# Patient Record
Sex: FEMALE | Race: BLACK OR AFRICAN AMERICAN | NOT HISPANIC OR LATINO | URBAN - METROPOLITAN AREA
[De-identification: names, ages, dates, MRNs, and addresses within clinical notes are randomized per-mention and may not be internally consistent; named-entity substitution may affect disease eponyms.]

---

## 2024-03-18 ENCOUNTER — OV NP (OUTPATIENT)
Dept: URBAN - METROPOLITAN AREA CLINIC 40 | Facility: CLINIC | Age: 24
End: 2024-03-18
Payer: COMMERCIAL

## 2024-03-18 VITALS
WEIGHT: 184 LBS | SYSTOLIC BLOOD PRESSURE: 120 MMHG | HEIGHT: 60 IN | BODY MASS INDEX: 36.12 KG/M2 | DIASTOLIC BLOOD PRESSURE: 80 MMHG | HEART RATE: 76 BPM

## 2024-03-18 DIAGNOSIS — R19.4 CHANGE IN BOWEL HABITS: ICD-10-CM

## 2024-03-18 PROCEDURE — 99203 OFFICE O/P NEW LOW 30 MIN: CPT | Performed by: INTERNAL MEDICINE

## 2024-03-18 RX ORDER — LACTOBACILLUS RHAMNOSUS GG 10B CELL
AS DIRECTED CAPSULE ORAL
Status: ACTIVE | COMMUNITY

## 2024-03-18 NOTE — HPI-TODAY'S VISIT:
Ms. Ferris is a 23-year-old black female seen for new patient evaluation of change in bowel habits.  Patient states on and off for the last 4 years she has noted issues.  Initially she called a fecal incontinence but on further questioning she is not having leakage of stool but rather mucus.  She states sometimes the mucus can pass when she is having flatulence or before a bowel movement.  Few weeks ago she noted some blood with the mucus although she thinks this is from her hidradenitis that is in that area.  Her stools can be loose and she is attributing this to some lactose issues.  She currently is taking Lactaid.  She has 2 bowel movements a day.  She denies any abdominal pain, nausea or reflux.  She has been on antibiotics on and off with his taking probiotics.  Her hidradenitis has been treated with Accutane as well as antibiotics.  She is never been on Humira.  There is no family history of inflammatory bowel disease.

## 2024-04-29 ENCOUNTER — OV EP (OUTPATIENT)
Dept: URBAN - METROPOLITAN AREA CLINIC 40 | Facility: CLINIC | Age: 24
End: 2024-04-29

## 2024-04-29 RX ORDER — LACTOBACILLUS RHAMNOSUS GG 10B CELL
AS DIRECTED CAPSULE ORAL
Status: ACTIVE | COMMUNITY

## 2024-05-24 ENCOUNTER — DASHBOARD ENCOUNTERS (OUTPATIENT)
Age: 24
End: 2024-05-24

## 2024-05-28 ENCOUNTER — OFFICE VISIT (OUTPATIENT)
Dept: URBAN - METROPOLITAN AREA CLINIC 40 | Facility: CLINIC | Age: 24
End: 2024-05-28

## 2024-05-28 RX ORDER — LACTOBACILLUS RHAMNOSUS GG 10B CELL
AS DIRECTED CAPSULE ORAL
Status: ACTIVE | COMMUNITY

## 2024-07-09 ENCOUNTER — OFFICE VISIT (OUTPATIENT)
Dept: URBAN - METROPOLITAN AREA CLINIC 40 | Facility: CLINIC | Age: 24
End: 2024-07-09

## 2024-08-06 ENCOUNTER — OFFICE VISIT (OUTPATIENT)
Dept: URBAN - METROPOLITAN AREA CLINIC 40 | Facility: CLINIC | Age: 24
End: 2024-08-06
Payer: COMMERCIAL

## 2024-08-06 ENCOUNTER — LAB OUTSIDE AN ENCOUNTER (OUTPATIENT)
Dept: URBAN - METROPOLITAN AREA CLINIC 40 | Facility: CLINIC | Age: 24
End: 2024-08-06

## 2024-08-06 VITALS
TEMPERATURE: 97.7 F | BODY MASS INDEX: 34.4 KG/M2 | HEIGHT: 60 IN | WEIGHT: 175.2 LBS | SYSTOLIC BLOOD PRESSURE: 128 MMHG | DIASTOLIC BLOOD PRESSURE: 82 MMHG

## 2024-08-06 DIAGNOSIS — R19.4 CHANGE IN BOWEL HABITS: ICD-10-CM

## 2024-08-06 PROCEDURE — 99214 OFFICE O/P EST MOD 30 MIN: CPT | Performed by: INTERNAL MEDICINE

## 2024-08-06 RX ORDER — LACTOBACILLUS RHAMNOSUS GG 10B CELL
AS DIRECTED CAPSULE ORAL
Status: ACTIVE | COMMUNITY

## 2024-08-06 NOTE — HPI-TODAY'S VISIT:
Ms. Ferris presents to clinic for follow-up.  She was initially seen in March.  She was complaining of a change in her bowel habits.  This was in the setting of her having a history of hidradenitis suppurativa.  We went ahead and got stool studies and advised her to follow FODMAP diet.  Stool studies were significant for borderline calprotectin at 115.  The rest of the stools were negative.  Patient did notice an improvement when cutting out lactose out of her diet with her stools.  She started doxycycline through her dermatologist as well as had wisdom teeth extracted and was on clindamycin.  She has some loose stools initially.  She had been on probiotic.  She is back to having 1-2 stools a day and then are more formed.  No rectal bleeding but she has noted intermittent mucus in the stool.

## 2024-10-25 ENCOUNTER — OFFICE VISIT (OUTPATIENT)
Dept: URBAN - METROPOLITAN AREA SURGERY CENTER 30 | Facility: SURGERY CENTER | Age: 24
End: 2024-10-25

## 2024-10-25 ENCOUNTER — CLAIMS CREATED FROM THE CLAIM WINDOW (OUTPATIENT)
Dept: URBAN - METROPOLITAN AREA SURGERY CENTER 30 | Facility: SURGERY CENTER | Age: 24
End: 2024-10-25
Payer: COMMERCIAL

## 2024-10-25 ENCOUNTER — TELEPHONE ENCOUNTER (OUTPATIENT)
Dept: URBAN - METROPOLITAN AREA CLINIC 40 | Facility: CLINIC | Age: 24
End: 2024-10-25

## 2024-10-25 DIAGNOSIS — K52.9 CHRONIC COLITIS: ICD-10-CM

## 2024-10-25 DIAGNOSIS — K63.89 OTHER SPECIFIED DISEASES OF INTESTINE: ICD-10-CM

## 2024-10-25 PROCEDURE — 00811 ANES LWR INTST NDSC NOS: CPT | Performed by: NURSE ANESTHETIST, CERTIFIED REGISTERED

## 2024-10-25 RX ORDER — MESALAMINE 1000 MG/1
1 SUPPOSITORY AT BEDTIME SUPPOSITORY RECTAL ONCE A DAY
Qty: 30 | OUTPATIENT
Start: 2024-10-25 | End: 2024-11-23

## 2024-10-25 RX ORDER — LACTOBACILLUS RHAMNOSUS GG 10B CELL
AS DIRECTED CAPSULE ORAL
Status: ACTIVE | COMMUNITY

## 2024-11-22 ENCOUNTER — OFFICE VISIT (OUTPATIENT)
Dept: URBAN - METROPOLITAN AREA CLINIC 40 | Facility: CLINIC | Age: 24
End: 2024-11-22
Payer: COMMERCIAL

## 2024-11-22 ENCOUNTER — TELEPHONE ENCOUNTER (OUTPATIENT)
Dept: URBAN - METROPOLITAN AREA CLINIC 6 | Facility: CLINIC | Age: 24
End: 2024-11-22

## 2024-11-22 VITALS
SYSTOLIC BLOOD PRESSURE: 132 MMHG | BODY MASS INDEX: 34.36 KG/M2 | WEIGHT: 175 LBS | HEART RATE: 85 BPM | HEIGHT: 60 IN | TEMPERATURE: 98.2 F | DIASTOLIC BLOOD PRESSURE: 80 MMHG

## 2024-11-22 DIAGNOSIS — R19.4 CHANGE IN BOWEL HABITS: ICD-10-CM

## 2024-11-22 DIAGNOSIS — K51.20 ULCERATIVE PROCTITIS: ICD-10-CM

## 2024-11-22 PROCEDURE — 99213 OFFICE O/P EST LOW 20 MIN: CPT | Performed by: PHYSICIAN ASSISTANT

## 2024-11-22 RX ORDER — LACTOBACILLUS RHAMNOSUS GG 10B CELL
AS DIRECTED CAPSULE ORAL
Status: ACTIVE | COMMUNITY

## 2024-11-22 RX ORDER — MESALAMINE 1.2 G/1
4 TABLETS WITH MEAL TABLET, DELAYED RELEASE ORAL ONCE A DAY
Qty: 360 TABLET | Refills: 0 | OUTPATIENT
Start: 2024-11-22 | End: 2025-02-20

## 2024-11-22 RX ORDER — MESALAMINE 1000 MG/1
1 SUPPOSITORY AT BEDTIME SUPPOSITORY RECTAL ONCE A DAY
Qty: 30 | Status: ACTIVE | COMMUNITY
Start: 2024-10-25 | End: 2024-11-23

## 2024-11-22 NOTE — HPI-TODAY'S VISIT:
Ms. Ferris presents to clinic for follow-up.  She was initially seen 3/18/24.  She was complaining of a change in her bowel habits.  This was in the setting of her having a history of hidradenitis suppurativa.  We went ahead and got stool studies and advised her to follow FODMAP diet.  Stool studies were significant for borderline calprotectin at 115.  The rest of the stools were negative.  Patient did notice an improvement when cutting out lactose out of her diet with her stools.  She started doxycycline through her dermatologist as well as had wisdom teeth extracted and was on clindamycin.  She has some loose stools initially.  She had been on probiotic.  She is back to having 1-2 stools a day and then are more formed.  No rectal bleeding but she has noted intermittent mucus in the stool.  The mucus has improved since her colonoscopy and starting Canasa suppositories nightly following colonoscopy.  No rectal bleeding.  Denies abdominal pain, change in bowel habits otherwise or mouth sores.  The patient tells me today that due to her hidradenitis suppurativa, she had been started on Accutane last year and took this for six months at a higher dose.  Recently, she was restarted on Accutane but notes that it was a much lower dose.  Recent blood work which we have not been able to review.  Colonoscopy October 25, 2024 noted proctitis/ulcerative colitis.  Moderate in severity and this was biopsied.  Per pathology, findings consistent with chronic active colitis likely consistent with ulcerative colitis.  No evidence of granuloma.  No dysplasia.

## 2024-11-25 ENCOUNTER — ERX REFILL RESPONSE (OUTPATIENT)
Dept: URBAN - METROPOLITAN AREA CLINIC 40 | Facility: CLINIC | Age: 24
End: 2024-11-25

## 2024-11-25 ENCOUNTER — TELEPHONE ENCOUNTER (OUTPATIENT)
Dept: URBAN - METROPOLITAN AREA CLINIC 40 | Facility: CLINIC | Age: 24
End: 2024-11-25

## 2024-11-25 RX ORDER — MESALAMINE 1000 MG/1
INSERT 1 SUPPOSITORY RECTALLY AT BEDTIME SUPPOSITORY RECTAL
Qty: 30 SUPPOSITORIES | Refills: 1 | OUTPATIENT

## 2024-11-26 ENCOUNTER — TELEPHONE ENCOUNTER (OUTPATIENT)
Dept: URBAN - METROPOLITAN AREA CLINIC 40 | Facility: CLINIC | Age: 24
End: 2024-11-26

## 2025-01-16 ENCOUNTER — OFFICE VISIT (OUTPATIENT)
Dept: URBAN - METROPOLITAN AREA CLINIC 40 | Facility: CLINIC | Age: 25
End: 2025-01-16

## 2025-01-21 ENCOUNTER — OFFICE VISIT (OUTPATIENT)
Dept: URBAN - METROPOLITAN AREA CLINIC 40 | Facility: CLINIC | Age: 25
End: 2025-01-21
Payer: COMMERCIAL

## 2025-01-21 VITALS
WEIGHT: 172 LBS | BODY MASS INDEX: 33.77 KG/M2 | TEMPERATURE: 97.7 F | HEART RATE: 84 BPM | HEIGHT: 60 IN | SYSTOLIC BLOOD PRESSURE: 118 MMHG | DIASTOLIC BLOOD PRESSURE: 78 MMHG

## 2025-01-21 DIAGNOSIS — R19.4 CHANGE IN BOWEL HABITS: ICD-10-CM

## 2025-01-21 DIAGNOSIS — K51.20 ULCERATIVE PROCTITIS: ICD-10-CM

## 2025-01-21 PROCEDURE — 99213 OFFICE O/P EST LOW 20 MIN: CPT | Performed by: PHYSICIAN ASSISTANT

## 2025-01-21 RX ORDER — LACTOBACILLUS RHAMNOSUS GG 10B CELL
AS DIRECTED CAPSULE ORAL
Status: ACTIVE | COMMUNITY

## 2025-01-21 RX ORDER — MESALAMINE 1.2 G/1
4 TABLETS WITH MEAL TABLET, DELAYED RELEASE ORAL ONCE A DAY
Qty: 360 TABLET | Refills: 0 | Status: ACTIVE | COMMUNITY
Start: 2024-11-22 | End: 2025-02-20

## 2025-01-21 RX ORDER — MESALAMINE 1000 MG/1
INSERT 1 SUPPOSITORY RECTALLY AT BEDTIME SUPPOSITORY RECTAL
Qty: 30 SUPPOSITORIES | Refills: 1 | Status: ACTIVE | COMMUNITY

## 2025-01-21 RX ORDER — MESALAMINE 1.2 G/1
4 TABLETS WITH MEAL TABLET, DELAYED RELEASE ORAL ONCE A DAY
Qty: 360 TABLET | Refills: 0
Start: 2024-11-22 | End: 2025-04-21

## 2025-01-21 RX ORDER — NORETHINDRONE ACETATE AND ETHINYL ESTRADIOL, ETHINYL ESTRADIOL AND FERROUS FUMARATE 1MG-10(24)
1 TABLET KIT ORAL ONCE A DAY
Status: ACTIVE | COMMUNITY

## 2025-01-21 NOTE — HPI-TODAY'S VISIT:
Ms. Ferris is a 24 year old Black female, last seen 11/22/24 for follow up of colonoscopy. She was initially seen 3/18/24.  She was complaining of a change in her bowel habits.  This was in the setting of her having a history of hidradenitis suppurativa.  We went ahead and got stool studies and advised her to follow FODMAP diet.  Stool studies were significant for borderline calprotectin at 115.  The rest of the stools were negative.   The mucus has improved since her colonoscopy and starting Canasa suppositories nightly following colonoscopy.  No rectal bleeding.  Denies abdominal pain, change in bowel habits otherwise or mouth sores.  The patient tells me today that due to her hidradenitis suppurativa, she had been started on Accutane last year and took this for six months at a higher dose.  Recently, she was restarted on Accutane but notes that it was a much lower dose.  Recent blood work which we have not been able to review.  Colonoscopy October 25, 2024 noted proctitis/ulcerative colitis.  Moderate in severity and this was biopsied.  Per pathology, findings consistent with chronic active colitis likely consistent with ulcerative colitis.  No evidence of granuloma.  No dysplasia. We did begin trial of mesalamine 4.8g daily. She is doing well on mesalamine therapy and has no complaint of current abdominal pain, change in bowel habits or rectal bleeding, using Canasa suppositories.  She has followed up with dermatology recently who maintains that the Accutane should be continued if possible.  Patient has no new complaints.  She did not complete labs as ordered last visit.  She did recently complete her LSAT.

## 2025-02-06 ENCOUNTER — TELEPHONE ENCOUNTER (OUTPATIENT)
Dept: URBAN - METROPOLITAN AREA CLINIC 40 | Facility: CLINIC | Age: 25
End: 2025-02-06

## 2025-05-21 ENCOUNTER — OFFICE VISIT (OUTPATIENT)
Dept: URBAN - METROPOLITAN AREA CLINIC 40 | Facility: CLINIC | Age: 25
End: 2025-05-21
Payer: COMMERCIAL

## 2025-05-21 DIAGNOSIS — K51.20 ULCERATIVE PROCTITIS: ICD-10-CM

## 2025-05-21 PROCEDURE — 99213 OFFICE O/P EST LOW 20 MIN: CPT | Performed by: PHYSICIAN ASSISTANT

## 2025-05-21 RX ORDER — MESALAMINE 1000 MG/1
INSERT 1 SUPPOSITORY RECTALLY AT BEDTIME SUPPOSITORY RECTAL
Qty: 30 SUPPOSITORIES | Refills: 1 | Status: ACTIVE | COMMUNITY

## 2025-05-21 RX ORDER — NORETHINDRONE ACETATE AND ETHINYL ESTRADIOL, ETHINYL ESTRADIOL AND FERROUS FUMARATE 1MG-10(24)
1 TABLET KIT ORAL ONCE A DAY
Status: ACTIVE | COMMUNITY

## 2025-05-21 RX ORDER — MESALAMINE 1.2 G/1
4 TABLETS WITH MEAL TABLET, DELAYED RELEASE ORAL ONCE A DAY
Qty: 360 TABLET | Refills: 1
Start: 2024-11-22

## 2025-05-21 RX ORDER — LACTOBACILLUS RHAMNOSUS GG 10B CELL
AS DIRECTED CAPSULE ORAL
Status: ACTIVE | COMMUNITY

## 2025-05-21 NOTE — HPI-TODAY'S VISIT:
Ms. Ferris is a 24 year old Black female, last seen 1/21/25 for follow up of colonoscopy. She was initially seen 3/18/24.  She was complaining of a change in her bowel habits.  This was in the setting of her having a history of hidradenitis suppurativa.  We went ahead and got stool studies and advised her to follow FODMAP diet.  Stool studies were significant for borderline calprotectin at 115.  The rest of the stools were negative.   The mucus improved since her colonoscopy and starting Canasa suppositories nightly following colonoscopy.  No rectal bleeding.  Denies abdominal pain, change in bowel habits otherwise or mouth sores.  She was previously started on Accutane last year and took this for six months at a higher dose.  Recently, she resumed Accutane but notes that it was a much lower dose.  Colonoscopy October 25, 2024 noted proctitis/ulcerative colitis.  Moderate in severity and this was biopsied.  Per pathology, findings consistent with chronic active colitis likely consistent with ulcerative colitis.  No evidence of granuloma.  No dysplasia. We did begin trial of mesalamine 4.8g daily.  She has followed up with dermatology recently who maintains that the Accutane should be continued if possible.  Patient has no new complaints. Patient has no complaints today.  She has no abdominal pain and states that bowel habits are normal, no rectal bleeding.  No new medications added recently.  She continues to comply with 4.8 g of mesalamine once daily.  States that she recently had blood work ordered through her primary care provider which may have included labs from which we had asked her to complete earlier this year.  We do not have these available to review today.

## 2025-05-22 ENCOUNTER — TELEPHONE ENCOUNTER (OUTPATIENT)
Dept: URBAN - METROPOLITAN AREA CLINIC 40 | Facility: CLINIC | Age: 25
End: 2025-05-22